# Patient Record
Sex: FEMALE | Race: BLACK OR AFRICAN AMERICAN | Employment: FULL TIME | URBAN - METROPOLITAN AREA
[De-identification: names, ages, dates, MRNs, and addresses within clinical notes are randomized per-mention and may not be internally consistent; named-entity substitution may affect disease eponyms.]

---

## 2024-02-14 ENCOUNTER — HOSPITAL ENCOUNTER (EMERGENCY)
Facility: HOSPITAL | Age: 44
Discharge: HOME | End: 2024-02-14
Payer: COMMERCIAL

## 2024-02-14 VITALS
SYSTOLIC BLOOD PRESSURE: 142 MMHG | BODY MASS INDEX: 40.36 KG/M2 | HEART RATE: 75 BPM | WEIGHT: 251.1 LBS | TEMPERATURE: 98.2 F | RESPIRATION RATE: 18 BRPM | DIASTOLIC BLOOD PRESSURE: 77 MMHG | OXYGEN SATURATION: 100 % | HEIGHT: 66 IN

## 2024-02-14 DIAGNOSIS — F17.200 SMOKING ADDICTION: ICD-10-CM

## 2024-02-14 DIAGNOSIS — K02.9 PAIN DUE TO DENTAL CARIES: Primary | ICD-10-CM

## 2024-02-14 PROCEDURE — 99283 EMERGENCY DEPT VISIT LOW MDM: CPT

## 2024-02-14 RX ORDER — AMOXICILLIN AND CLAVULANATE POTASSIUM 875; 125 MG/1; MG/1
1 TABLET, FILM COATED ORAL 2 TIMES DAILY
Qty: 14 TABLET | Refills: 0 | Status: SHIPPED | OUTPATIENT
Start: 2024-02-14 | End: 2024-02-21

## 2024-02-14 RX ORDER — IBUPROFEN 600 MG/1
600 TABLET ORAL EVERY 8 HOURS PRN
Qty: 20 TABLET | Refills: 0 | Status: SHIPPED | OUTPATIENT
Start: 2024-02-14

## 2024-02-15 NOTE — ED TRIAGE NOTES
Patient ambulatory to triage with complaints of dental pain that has been ongoing for several weeks. States she had cracked a tooth on the L side of her mouth, wasn't able to get seen yet, then states it formed an abscess, and now today she started having some numbness in b/l hands, denies numbness anywhere else in arms / rest of her extremities / head. Was concerned as she had still not been able to get into the dentist.

## 2024-02-15 NOTE — ED PROVIDER NOTES
Will offer information regarding primary care at the [facility]    Records Reviewed (source and summary of external records): Nursing Notes    CC/HPI Summary, DDx, ED Course, and Reassessment:     Patient presents ambulatory with several weeks or longer of left lower dental pain that is worse with eating and drinking.  Has been no fever.    On exam, she is afebrile and well-appearing.  She does have decayed remnants of the left lower first molar without visible abscess.  There is no sublingual edema to suggest Ludewig's angina.  There is no posterior oropharyngeal erythema or edema to suggest retropharyngeal abscess or peritonsillar abscess.    Additional testing deferred.  Will place on antibiotics for suspected infected dental caries.  Will offer ibuprofen for pain.  Note for work/school is offered.  A list of dental services provided and she is referred to [facility].     Patient tells me she smokes cigarettes and she is urged to quit smoking    TOBACCO COUNSELING:  Spent 1-5 minutes discussing the risks of smoking and the benefits of smoking cessation as well as the long term sequelae of smoking with the pt who verbalized his understanding. Reviewed strategies for success, including gradually decreasing the number of cigarettes smoked a day.      Disposition Considerations (Tests not done, Shared Decision Making, Pt Expectation of Test or Tx.):      FINAL IMPRESSION     1. Pain due to dental caries    2. Smoking addiction          DISPOSITION/PLAN   DISPOSITION Decision To Discharge 02/14/2024 11:49:28 PM    Discharge Note: The patient is stable for discharge home. The signs, symptoms, diagnosis, and discharge instructions have been discussed, understanding conveyed, and agreed upon. The patient is to follow up as recommended or return to ER should their symptoms worsen.      PATIENT REFERRED TO:  [facility]  [facility] [address] [place] [id number] [phone]  Call   DENTAL SERVICES: call to

## 2024-02-15 NOTE — DISCHARGE INSTRUCTIONS
Emergency Dental Care     [facility] - Operated by [facility]  [address] [place], [place] [address]  Open M, W, F: 8AM - 5PM and T, Th: 8AM-6PM  Phone: [phone], press 4  $70 for Emergency Care  $60 for first routine care, then pay by sliding scale based upon income.    [facility]'s [address] [address]  Phone: [phone]    [facility]  [address] [place] [address]  Open Monday - Friday 8AM - 4:30 PM  Phone: [phone]    [facility]  [facility], [facility], [address] [place] [address], 1st Floor  First Come First Service starting at 8:30 AM M-F  Phone: [phone], press 2  Fee: $150 per tooth (x-ray & extractions only)  Pediatrics Phone:: [phone], 8-5 M-F    [facility]  [facility], [facility], [address] [place] [address], 2nd Floor, [address]  First Come First Service starting at 8:30 AM - 3 PM M - F    [facility]  [address] [place] [address]  Phone: [phone] or [phone]  Emergency Hours: 9:30AM - 11AM (extractions)  Simple tooth extraction $ per tooth. #75 for x-ray    [place] Residents only, over the age of 18  Phone: [phone]. Leave message saying you need an appointment to register.  Hours: Tuesday Evenings